# Patient Record
Sex: FEMALE | Race: WHITE | Employment: UNEMPLOYED | URBAN - METROPOLITAN AREA
[De-identification: names, ages, dates, MRNs, and addresses within clinical notes are randomized per-mention and may not be internally consistent; named-entity substitution may affect disease eponyms.]

---

## 2019-07-25 PROBLEM — E66.01 SEVERE OBESITY (HCC): Status: ACTIVE | Noted: 2019-07-25

## 2019-08-28 ENCOUNTER — HOSPITAL ENCOUNTER (OUTPATIENT)
Dept: NUTRITION | Age: 64
Discharge: HOME | End: 2019-08-28
Payer: COMMERCIAL

## 2019-08-28 PROCEDURE — 97802 MEDICAL NUTRITION INDIV IN: CPT

## 2019-08-28 NOTE — PROGRESS NOTES
NUTRITION ASSESSMENT  Patient History:  Pt referred by Dr. Shelli Kehr with diagnosis of morbid obesity. Medical history remarkable for breast cancer w/ chemotherapy (2005), depressive disorder (controlled), and diabetes (referring provider note indicates an HgA1c of 6.5 with last lab).  Previous Dieting Attempts/Current Knowledge: Pt not noting any diets in the past on assessment forms. Voices some basic knowledge regarding healthy food choices and no prior nutrition education.  Lifestyle/Cultural/Family Influence: Pt is  and keeps home; has children now adults.  Motivation: Pt states she noticed a \"morbid obesity\" diagnosis and expressed concerns to referring provider who then informed pt of program here. Pt states she is \"ready to learn. \" Gives details of a discussion of weight that occurs ongoing with her . Also concerned that her blood sugars are running high in the mornings. would like to manage diabetes better.  Support: Unable to determine support apart from appts here.  Barriers: Pt verbalizes issues with emotional health that lead to stress eating and per pt related to relationship with . Notes that with her weight gain she tends to try to limit sweet treats and carbohydrates, but now feels that she does not do well if those items ron in the house. States she is very aware of them and may end up eating too much.  Stage of Change (Transtheoretical Model): Behaviors indicate current stage of change is: Preparation. Anticipate some steps toward compliance with recommendations.       Anthropometrics:   Ht: 5' (1.524m)  Wt:184.6 (83.9kg) per pt BMI: 36.1 (Obesity Class II) IBW for ht: 100 +/- 10%   IBW for minimum recommended BMI when pt>65 years: 120lb     Highest Weight: 213lb  Lowest Weight: 118lb (1975)       Weight History:  WT / BMI WEIGHT BODY MASS INDEX   8/15/2019 184 lb 8 oz 36.03 kg/m2   7/25/2019 187 lb 36.52 kg/m2     Nutrition Focused Physical Findings:   N/A. Nutritionally Relevant Medications:  metformin  levothyroxine    Supplements/Vitamins/Herbs:  multi-vitamin    Nutritionally Relevant Labs:  Reviewed. HgA1c 6.5 last (noted in referral progress note)    Physical Activity:  None    Sleep Habits:  Not noted by pt. Food and Nutrient Intake:   Based on reported usual intake, pt consumes  2-3 meals/day with snacks. States she has recently been having a snack late at night before bed. Dines out at 1 time/ week. Appears able to obtain appropriate nutritional choices as desired. Diet Recall:  Breakfast: 3 pancakes; sugar free syrup; hot tea  Lunch: 4 chicken strips; fries, honey mustard, diet soda, no-added sugar ice-cream  Dinner: not recorded  Snacks: evening notes cereal and milk as an example. Beverages: \"lots of water\" /day, 1-2 diet soda/week; unspecified amount of unsweetened tea/day  Diet Appears:   low in calcium containing choices   low in servings of fruits and vegetables   low in fiber   low in protein   unable to determine in added sugar; pt notes she may overeat some treats,   low in heart healthy fats    Food Allergies/Intolerances:   None noted. Estimated Nutritional Needs to Maintain Current Wt:  EER: 1716 kcal/day (MSJ with activity factor 1q.3 +/- 10% margin of error  EER for weight loss: 1700 - 500 = 1200 kcal/day  Carbohydrates: 120 g/day (40% of kcal) or  30,45,30g/meal(3) with 15 g/snack(1) (Approximately 350kcal/meal with 180 kcal/snack)  Protein: 75gm/day (25% of kcal)  Fat: 47 gm/day (35% of kcal) Heart- healthy fats emphasized with lists given. Fluids: 1.2 L/day (1 ml/kcal)       NUTRITION DIAGNOSIS:   Obesity r/t history of excessive oral intake and food and nutrition-related knowledge deficit regarding appropriate food choices and eating patterns to maintain healthy weight as evidenced by *food recall as above, pt's report of excessive intake during stressful emotions, and BMI 36.1.     NUTRITION INTERVENTION:  Appointment length: 90 minutes. Nutrition Education:  Purpose of nutrition education: To provide pt with education to increase knowledge for healthful po intake and to provide a meal plan/ meal template as an educational resource for reference in building healthy, balanced meals and snacks. Nutrition relationship in health/disease: Explained to pt the connection between food choices and health outcomes. Explained to pt the impact of over-nutrition and excessive intake of inflammatory foods/ processed foods in the disease process for chronic disease. Recommended modifications:   · Eat 3 balanced meals per with 1 snack eating every 4 hours. · Take 20 minutes minimum for meal enjoyment and leave behind any bites of food after pt feels full and satisfied. · 80/20 rule for healthy food choices/ fun food choices     CONTENT:  The Plate Method:  · Educated pt on balancing intake of macronutrients at meals and snacks. Discussed the importance of adequate intake of all macronutrients for satiety and satisfaction. · Discussed proportions on plate: 1/2 non-starchy vegetables, 1/4 protein, 1/4 starch. Stressed the importance of healthy fat with every meal (lists given)  · Demonstrated appropriate portion sizes for various food groups and strategies for portion control. · Carbohydrates: Reviewed food groups containing CHO, provided food examples. Encouraged obtaining CHO from a variety of food groups to vary micronutrient intake, choosing less processed carbs with higher fiber. Encouraged decreasing intake of refined CHO foods/beverages with limited nutritional value and/or those high in added sugar. Reviewed list of starchy vegetables. · Protein: Reviewed food groups containing protein, provided food examples. Stressed the importance of adequate protein intake with each meal/snack to promote satiety following meals and maintenance of lean body mass.    · Fat: Educated pt on importance of essential fats in diet. Advised of food sources. Explained the need to increase sources of omega 3 fats and gave products to look for. Educated pt on trans fats and harm caused with consumption. Explained how to find trans fats in ingredient lists verses rather than nutrition fact label. APPLICATION:  Skill development using Meal Plan:  · Provided pt with the MyPlate Template meal planner (as above) and educated pt on individualized portions. Used hand models and measuring cups to demonstrate appropriate portion sizes. Encouraged pt to use the meal plan as a teaching tool learn about food groups and appropriate portion sizes for present activity level. Advised using portions as a guide and to rely on kcal level if needed when dining out. · Explained to pt how to use the meal planner sheets with the meal plan. Pt composed 3 sample meals in office using meal planner. Nutrition Counseling: Motivational Interviewing:    Discussed pts intake and activity patterns as above. Reviewed 3 broad areas that impact weight: food choices, physical activity level, and energy balance. Advised that weight management should be viewed as a learning process and best accomplished by making small goals that pt can continue with for life. Essentially building health lifestyle that will facilitate weight loss and maintenance.  Advised pt on the qualities of individuals who are able to lose weight and maintain that wt loss. Highlighted that maintainers tend to continue in the dietary patterns and physical activity patterns they established in order to lose the weight. Advised designing an approach with small goals that pt can practically plan on continuing for life (new behaviors). Encouraged a focus on healthful intake and to avoid a focus on weight numbers.  Engaged pt in a discussion of flexibility for food choices balanced with a focus on health. Recommended an 80/20 or 90/10  approach for healthful intake verses fun foods.  Pt notes considerable barrier with some foods that she feels do not help with weight reduction. States that her  has explained to her that her weight has become an issue in their relationship and pt notes he may jevlauate her intake with words or with pointed looks. Recommended  A mindful eating approach and gave handouts outlining. Advised that pt may benefit from psychotherapeutic marriage counseling to address barriers in her relationship with her  that pt notes has an impact on her eating patterns.  Discussed mindful eating approach and gave materials for pt to read. Encouraged pt to begin the process of \"self-talk\" to evaluate principles of mindful eating. Gave examples of thought pattern to develop in consideration of food choices,. Pros and cons, frequency, and other goals in life (control diabetes; reduce health risks). Self-monitoring:   · Discussed the significance of support while attempting behaviors changes. Advised the goals of this program are to educate pt an appropriate intake through nutrition planning and skill development for health and weight management. Notes assistance with addressing barriers to action steps and recommended counseling can assist in developing self-efficacy. The long term goal is to develop self- monitoring, self-regulating behaviors for life-long management skills. Cognitive restructuring:   · Discussed complete health (physical, emotional, social, vocational, intellectual, and spiritual) and how all of these areas affect each other and also can affect eating habits when individual areas of health may need to be addressed. · Pt voices barriers in emotional and social health that may affect attempts with behavior changes. Voices relationship factors with  that are affecting her viewpoint of self and of food choices. (Notes it leads to secretive eating at times)    Goal Setting: Pt agreed to the following goals.   Goal: Pt will eat 3 balanced meals per day with 1 snack, using the 80/20 food choices rule for healthy foods and fun foods. Plan: Pt will use MyPlate meal planner template and planning sheets along with other materials given as guides and support to structure eating in timing, food choices, macronutrient amounts, and portion sizes for goal stated (as above). Proposed Goal: Pt will increase frequency and consistency of physical activity to facilitate wt loss. Plan: No plan discussed at this appointment,    Materials Given:  Weight Management Basics  MyPlate Template Meal Plan with portions for 2013-2047 kcal/day  Meal Planner Sheets  Healthy Eating Pattern : NIH  Goal Setting- pt selected action plan form   Fat Basics   Complete Health Details  Mindful Eating Handout  Eating Disorder/ Therapeutic Counselors for addressing emotional/stress eating in [place]:    Johana Velasco MA, RD, LPC;    JOHAN MockT, LMFT, ANDRA   [employer]  Mindful Eating verses Emotional Eating  Mindful Eating- [address] [facility]. MONITORING AND EVALUATION:    RECOMMENDATIONS FOR CONTINUED APPOINTMENTS/ SUPPORT:  · Pt was attentive and asked pertinent questions during appointment. It appears pt will initiate some action steps  · Pt would benefit from continued support with appts with dietitian until action steps are established for 6 months. · Frequency depends on any setbacks or need for support. · Best practice guidelines indicate pts with most success follow up with RD:  regularly, every 2 weeks, especially when beginning new dietary changes (2013 AHA/ACC/TOS Guideline for the Management of Overweight and Obesity in Adults). Follow up appt: 9/12/19 @ 10:00am.  Follow-up Monitoring Plans: Will monitor any wt change if pt agrees to weight measurement. Will assess and address any barriers to or success with plans. Will review meal recalls or planner sheets to compare with meal plan.     Maury Burch, MS, RD,CSSD, LD  W: [phone]

## 2019-09-18 ENCOUNTER — HOSPITAL ENCOUNTER (OUTPATIENT)
Dept: NUTRITION | Age: 64
Discharge: HOME | End: 2019-09-18
Payer: COMMERCIAL

## 2019-09-18 PROCEDURE — 97803 MED NUTRITION INDIV SUBSEQ: CPT

## 2019-09-18 NOTE — PROGRESS NOTES
Problem: Nutrition Deficit   Goal: *Optimize nutritional status     NUTRITION FOLLOW UP:    Monitoring of weight:  Actual BW: Did not weigh pt secondary to primary focus on value of healthy behaviors verses success measured by weight loss. Weight Loss: Pt reports that she has lost weight. Home scale at 179lb down from 185 in EMR. ASSESSMENT (of Interventions):   Goal Setting: Pt agreed to the following goals. Goal: Pt will eat 3 balanced meals per day with 1 snack, using the 80/20 food choices rule for healthy foods and fun foods. Plan: Pt will use [employer] meal planner template and planning sheets along with other materials given as guides and support to structure eating in timing, food choices, macronutrient amounts, and portion sizes for goal stated (as above). Progress: Pt notes that she \"has really tried\" to meet the portions noted on meal template. States it has been hard to meet carbohydrate portions at some meals but that she has. Reports that she tends to eat breakfast late- as late as 11:00 or 12:00 if she waits for her . States she has reduced much of her evening snacking. Reports it is hard though- states one night she reflected that she had eaten enough (her portions) for the day, had water, and went to bed. States she knows that the food was something that can pick her up. Still reflects some at this appt on relationship with  and feeling that he is evaluating her po intake though glances or comments. Pt does report one or two bad days, but notes that she thought about what was advised and will be re-enforced here that \"I I am not a bad person because I made the choice to eat that way\" for some meal or snack outside of recommendations. States she was able to move on and  with efforts for the next meal/ next day.  Praised pt for that perspective and acknowledge that emotional or stress eating is a very common behavior and encouraged pt to continue to explore what triggers it and to seek any therapeutic counseling for training to develop alternative coping skills. Praised pt for her awareness and acknowledgement of this behavior and for moving forward with plans in a positive manner. Proposed Goal: Pt will increase frequency and consistency of physical activity to facilitate wt loss. Plan: No plan discussed at this appointment,  Progress: Pt continues with daily walking her family dog. States she knows she needs to have a more brisk walk and states she remembers my suggestions to walk briskly 5 minutes or so before coming back to get the dog to walk (as an initial step). Continued barriers/ New barriers identified:  · Pt verbalizes continued issues with emotional health (realtionship with ) that may lead to stress eating. NUTRITION DIAGNOSIS: Initial  Obesity r/t history of excessive oral intake and food and nutrition-related knowledge deficit regarding appropriate food choices and eating patterns to maintain healthy weight as evidenced by *food recall as above, pt's report of excessive intake during stressful emotions, and BMI 36. 1.     INTERVENTION:  Appointment length: 60 minutes. Nutrition Education:  · Reviewed pt's meal recalls and confirmed how portions meet recommendations. · Answered pt's questions regarding foods that she may be able to eat \"freely\": sugar-free jello or candy, popcorn, \"empty calories\". Advised that pt may find that using \"free foods\" to eat outside of hunger delays the development of a long-term healthy relationship with food. Discussed difficulties with breaking habits and that if pt replaces with something that less impact on her blood sugars, that may be an initial step to take. Although recommended and encouraged to consider addressing root cause with appropriate therapeutic counseling.    · Pt remains perplexed that her blood sugars may run high in the morning after an example dinner meal of 1 regular size hamburger on a hamburger bun, 5 olives, and 10 potato chips- no other food in the evening. Advised pt to continue with her controlled carbohydrate diet with balanced meals and to discuss blood sugar readings with her medical provider monitoring the diabetes. Encouraged pt to adjust meal timing if possible to have breakfast earlier after waking, lunch earlier, and evening meal as early enough for 3 hour spacing between meal and bedtime. · Pt asked about bagged microwave popcorn. Noted most have trans fats in them. Suggested using olive oil to cook popcorn kernels over the stove. Motivational Interviewing:  · Pt expressed change talk and resolve to stick with plans at this appt. Is willing to continue with plans and acknowledges that it is difficult. Encouraged to that as she continues in new behaviors, they will become more engrained as a habit and that pt may find decisions easier to stick too. · Explained complete health handout again and advised why stress eating is a pull- eating a reward to brain. Advised that if some area of health needs attention that uncomfortable feeling can drive thoughts toward food- a easy  especially when there is not an alternative way of coping. Pt verbalizes good understanding. Encouraged pt to continue to consider her relationship with food and build awareness of the thoughts that determine when and what and how much she eats. · Pt recalls that she came from an [facility] family where a big focus was placed on eating and encouraged in volumes. Notes as a kid she often heard family members encourage er to eat more, but then as a teen would hear comments about her weight. States when she got  it was a happy time and that she weighed her lowest weight. Notes weight management became difficult again with 3 pregnancies. 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   · Reviewed the benefits pt perceives with making lasting changes to healthier eating patterns which will concurrently help pt achieve a healthy weight. Pt notes her biggest  is that she strongly desires to avoid diabetes complications. States she feels she is \"finally ready to do something about this. \" Encouraged pt to focus on health/ well being goals verses any negative thoughts about weight or other's perception of her weight (one trigger pt identifies for stress eating). Strategies for addressing barriers:  · Nutrition counseling and Motivational Interviewing (as above). Meals and Snacks:  No changes to meal template at this time. Goal Setting:  Goal: Pt will eat 3 balanced meals per day with 1 snack, using the 80/20 food choices rule for healthy foods and fun foods. Plan: Pt will use [employer] meal planner template and planning sheets along with other materials given as guides and support to structure eating in timing, food choices, macronutrient amounts, and portion sizes for goal stated (as above). Goal: Pt will increase frequency and consistency of physical activity to facilitate wt loss. Plan: Pt states she will walk briskly for 5-7 minutes before coming back to get her dog for a daily walk. MONITORING/EVALUATION:   RECOMMENDATIONS FOR CONTINUED APPOINTMENTS/ SUPPORT:  · Pt was attentive and asked pertinent questions during appointment. It appears pt will continue with action steps. · Pt demonstrates behaviors that indicate patient is in stage of change:Action (The Transtheoretical  Model)   · Pt would benefit from continued support with appts with dietitian until action steps are established for at least 6 months. · Best practice guidelines indicate pts with most success follow up with RD:  regularly, every 2 weeks, especially when beginning new dietary changes (2013 AHA/ACC/TOS Guideline for the Management of Overweight and Obesity in Adults). F/U Appointment Schedule: 10/16/19 @3:00pm  Plans: Will monitor any wt change per pt preference.   Will assess and address any barriers to or success with plans.       Pino Boucher, MS, RD, CSSD, LD  Outpatient Clinical and Wellness Dietitian  [place]  [phone]

## 2019-10-14 ENCOUNTER — DOCUMENTATION ONLY (OUTPATIENT)
Dept: NUTRITION | Age: 64
End: 2019-10-14

## 2019-10-14 NOTE — PROGRESS NOTES
Nutrition Counseling:  Pt referred by Dr. Jose M Berry. Pt called to cancel her f/u appt for this week. States she feels she has the information and the tools that she needs. Reports a few off days, but states she knows what she needs to do. Advised to call with questions or to otherwise f/u with provider. Will close referral on this end.     Sanjiv Virgen MS, RD, CSSD, LD  W: [phone]